# Patient Record
(demographics unavailable — no encounter records)

---

## 2025-01-20 NOTE — ED.PDOC
History of Present Illness


HPI Comments


22-year-old female complaining of vaginal discharge x2 weeks.  States she was 

seen at urgent care and prescribed medications but states this happened at work.

 Over the last five days symptoms have gotten worse.  Patient reports greenish 

vaginal discharge with vaginal itching.  Burning with urination.  Denies any 

change in sexual partners.  No recent sexual activity.  States she took an 

antibiotic for one week.


Chief Complaint:  Vaginal Discharge


Time Seen by MD:  22:58


Primary Care Provider:  SARITA


Reviewed Notes:  Nurses Notes


Allergies:  


Coded Allergies:  


     NO KNOWN ALLERGIES (Unverified , 8/12/21)


Home Meds


Active Scripts


Cephalexin Monohydrate (Cephalexin) 500 Mg Tab, 1 TAB PO QID for 3 Days, #12 TAB


   Prov:SARAH PATTERSON MD         12/28/24


Naproxen (Naproxen) 500 Mg Tab, 500 MG PO BID, #24 TAB


   Prov:ODETTE FREIRE         9/28/22


Sulfamethoxazole W/Trimethopri (Bactrim Ds Tablet) 1 Tab Tb, 1 TAB PO BID for 8 

Days, #16 TAB


   Prov:ODETTE FREIRE         9/28/22


Hydroxyzine Pamoate (Vistaril) 50 Mg Cap, 50 MG PO QHSP PRN, #20 CAP


   Prov:ODETTE FREIRE         3/12/22


Doxycycline (Monohydrate) (DOXYCYCLINE) 150 Mg Cap, 150 MG PO BID for 7 Days, 

#14 CAP 0 Refills


   Prov:TYE JIMÉNEZ [facility]         2/27/22


Information Source:  Patient


Mode of Arrival:  Ambulatory





Past Medical History


PAST MEDICAL HISTORY:  Anxiety, Depression


Surgical History:  Denies all surgeries


GYN History:  No Pertinent GYN History





Family History


Family History:  Unknown





Social History


Smoker:  Non-Smoker


Alcohol:  Denies ETOH Use


Drugs:  Denies Drug Use


Lives In:  Homeless





Constitutional:  denies: chills, diaphoresis, fatigue, fever, malaise, sweats, 

weakness, others


EENTM:  denies: blurred vision, double vision, ear bleeding, ear discharge, ear 

drainage, ear pain, ear ringing, eye pain, eye redness, hearing loss, mouth 

pain, mouth swelling, nasal discharge, nose bleeding, nose congestion, nose 

pain, photophobia, tearing, throat pain, throat swelling, voice changes, others


Respiratory:  denies: cough, hemoptysis, orthopnea, SOB at rest, shortness of 

breath, SOB with excertion, stridor, wheezing, others


Cardiovascular:  denies: chest pain, dizzy spells, diaphoresis, Dyspnea on 

exertion, edema, irregular heart beat, left arm pain, lightheadedness, 

palpitations, PND, syncope, others


Gastrointestinal:  denies: abdomen distended, abdominal pain, blood streaked 

bowels, constipated, diarrhea, dysphagia, difficulty swallowing, hematemesis, 

melena, nausea, poor appetite, poor fluid intake, rectal bleeding, rectal pain, 

vomiting, others


Genitourinary:  denies: abnormal vagina bleeding, burning, dyspareunia, dysuria,

flank pain, frequency, hematuria, incontinence, pain, pregnant, vagina dischar

ge, urgency, others


Neurological:  denies: dizziness, fainting, headache, left sided numbness, left 

sided weakness, numbness, paresthesia, pre-existing deficit, right sided 

numbness, right sided weakness, seizure, speech problems, tingling, tremors, 

weakness, others


Musculoskeletal:  denies: back pain, gout, joint pain, joint swelling, muscle 

pain, muscle stiffness, neck pain, others


Integumetry:  denies: bruises, change in color, change in hair/nails, dryness, 

laceration, lesions, lumps, rash, wounds, others


Allergic/Immunocompromised:  denies: Difficulty Healing, Frequent Infections, 

Hives, Itching, others


Hematologic/Lymphatic:  denies: anemia, blood clots, easy bleeding, easy 

bruising, swollen glands, others





Physical Exam


General Appearance:  No Apparent Distress, Normal


HEENT:  Normal ENT Inspection, Pharynx Normal, TMs Normal


Neck:  Full Range of Motion, Non-Tender, Normal, Normal Inspection


Respiratory:  Chest Non-Tender, Lungs Clear, No Accessory Muscle Use, No 

Respiratory Distress, Normal Breath Sounds


Cardiovascular:  No Edema, No JVD, No Murmur, No Gallop, Normal Peripheral 

Pulses, Regular Rate/Rhythm


Breast Exam:  Deferred


Gastrointestinal:  No Organomegaly, Non Tender, No Pulsatile Mass, Normal Bowel 

Sounds, Soft


Genitalia:  Deferred


Pelvic:  Deferred


Rectal:  Deferred


Extremities:  No calf tenderness, Normal capillary refill, Normal inspection, 

Normal range of motion, Non-tender, No pedal edema


Musculoskeletal :  


   Apperance:  Normal


Neurologic:  Alert, CNs II-XII nml as Tested, No Motor Deficits, Normal Affect, 

Normal Mood, No Sensory Deficits


Cerebellar Function:  Normal


Reflexes:  Normal


Skin:  Dry, Normal Color, Warm


Lymphatic:  No Adenopathy





Was a procedure done?


Was a procedure done?:  No





Differential Dx


Considerations may include:


Gonorrhea, chlamydia, BV, yeast infection





X-Ray, Labs, Meds, VS





                                   Vital Signs








  Date Time  Temp Pulse Resp B/P (MAP) Pulse Ox O2 Delivery O2 Flow Rate FiO2


 


1/19/25 22:50 97.9 105 16 109/82 (91) 99   








                                       Lab








Test


 1/19/25


23:13 Range/Units


 


 


Urine Color Light-yellow  Yellow  


 


Urine Clarity Clear  Clear  


 


Urine pH 5.5  5.0-9.0  


 


Urine Specific Gravity 1.030  1.001-1.035  


 


Urine Protein Negative  Negative  


 


Urine Ketones Negative  Negative  


 


Urine Blood Negative  Negative  /uL


 


Urine Nitrite Negative  Negative  


 


Urine Bilirubin Negative  Negative  


 


Urine Urobilinogen Normal  Negative  mg/dL


 


Urine Leukocyte Esterase Trace  Negative  /uL


 


Urine RBC 1  0 - 4  /hpf


 


Urine WBC 2  0 - 5  /hpf


 


Urine Squamous Epithelial


Cells Few 


 <5  /hpf





 


Urine Bacteria None seen  None Seen  /hpf


 


Urine Glucose Normal  Normal  mg/dL


 


Urine Pregnancy Test Negative  Negative  


 


Chlamydia trachomatis (OUSMANE) Pending   


 


Neisseria gonorrhoeae (OUSMANE) Pending   








X-Ray, Labs, Meds, VS Comment


Imaging: X-rays and CT scans were reviewed and interpreted by this provider, 

imaging shows no fractures and no pathological disease.  Pending radiology 

review.





Laboratory: Labs reviewed and interpreted by this provider.  No significant 

abnormalities noted.





Patient has prior medical visits reviewed.  Med reconciliation performed 


Vital signs reviewed


Time of 1ST Reevaluation:  02:09


Reevaluation 1ST:  Improved


Patient Education/Counseling:  Diagnosis, Treatment, Need For Follow Up (Patient

advised to follow-up in the emergency room in the next 24 to 48 hours if 

symptoms do not improve.  Advised follow-up with PCP in the next 3 to 5 days.  

Patient verbalized understanding. )


Family Education/Counseling:  Diagnosis





Departure 1


Departure


Time of Disposition:  02:05


Impression:  


   Primary Impression:  


   Bacterial vaginosis


Disposition:  01 HOME / SELF CARE / HOMELESS


Condition:  Fair


e-Prescriptions


Fluconazole (Diflucan) 150 Mg Tab


1 TAB PO ONCE for 2 Days, #2 TAB 1 Refill


   Prov: PAUL BYRD         1/20/25 


Metronidazole (Flagyl) 500 Mg Tab


1 TAB PO BID for 7 Days, #14 TAB


   Prov: PAUL BYRD         1/20/25


Discharged With:  Self





Critical Care Note


Critical Care Time?:  No





Stability


Stability form required:  No





Heart Score


Heart Score:  








Heart Score Response (Comments) Value


 


History N/A 0


 


EKG N/A 0


 


Age N/A 0


 


Risk Factors N/A 0


 


Troponin N/A 0


 


Total  0

















PAUL BYRD         Jan 20, 2025 02:09